# Patient Record
Sex: MALE | NOT HISPANIC OR LATINO | URBAN - METROPOLITAN AREA
[De-identification: names, ages, dates, MRNs, and addresses within clinical notes are randomized per-mention and may not be internally consistent; named-entity substitution may affect disease eponyms.]

---

## 2023-12-08 ENCOUNTER — OFFICE VISIT (OUTPATIENT)
Dept: URBAN - METROPOLITAN AREA CLINIC 19 | Facility: CLINIC | Age: 73
End: 2023-12-08
Payer: MEDICARE

## 2023-12-08 ENCOUNTER — LAB OUTSIDE AN ENCOUNTER (OUTPATIENT)
Dept: URBAN - METROPOLITAN AREA CLINIC 19 | Facility: CLINIC | Age: 73
End: 2023-12-08

## 2023-12-08 ENCOUNTER — DASHBOARD ENCOUNTERS (OUTPATIENT)
Age: 73
End: 2023-12-08

## 2023-12-08 VITALS
DIASTOLIC BLOOD PRESSURE: 72 MMHG | HEART RATE: 53 BPM | TEMPERATURE: 98.4 F | SYSTOLIC BLOOD PRESSURE: 118 MMHG | BODY MASS INDEX: 28.93 KG/M2 | WEIGHT: 180 LBS | HEIGHT: 66 IN

## 2023-12-08 DIAGNOSIS — Z12.11 SCREENING COLON CANCER: ICD-10-CM

## 2023-12-08 DIAGNOSIS — R19.4 CHANGE IN BOWEL HABIT: ICD-10-CM

## 2023-12-08 PROCEDURE — 99203 OFFICE O/P NEW LOW 30 MIN: CPT | Performed by: NURSE PRACTITIONER

## 2023-12-08 PROCEDURE — 99243 OFF/OP CNSLTJ NEW/EST LOW 30: CPT | Performed by: NURSE PRACTITIONER

## 2023-12-08 RX ORDER — POLYETHYLENE GLYCOL 3350, SODIUM CHLORIDE, SODIUM BICARBONATE, POTASSIUM CHLORIDE 420; 11.2; 5.72; 1.48 G/4L; G/4L; G/4L; G/4L
4,000 ML POWDER, FOR SOLUTION ORAL
Qty: 1 | Refills: 0 | OUTPATIENT
Start: 2023-12-08 | End: 2023-12-09

## 2023-12-08 NOTE — HPI-TODAY'S VISIT:
Mr. Leon is a 74 yo male with PMH of newly diagnosed CKD stage 3a, HTN, asthma, spinal stenosis who presents today for concern of change in bowel habits, decreased appetite. Sent upon referral from Dr. Aileen Ivey. A copy of this report will be sent to the referring provider.   No family history of colon cancer.  He reports his last colonoscopy was in 1983 in [place], reports normal but was told to repeat in 5 years.  He reports allergy to toilet paper - causes flaming itching redness so he has to use cottonelle wipes.  Reports since being diagnosed with CKD, his Nephrologist put him a new diet. States he used to eat very unhealthy diet and junk food previously so now that he is eating healthier, he has lost 12 pounds which has stabilized. States he is not so concerned with the weight loss, he feels it is due to making healthier choices.  He is concerned about change in bowel habits. States he used to have regular normal BMs every morning after coffee. Lately has noticed his BMs have been alternating between diarrhea which he will have for about a week and then he will have constipation. No bloody or black stools. No abdominal pain.  He is not on any blood thinners. Used to take Ibuprofen about 2-3 times/week but stopped taking it when his Nephrologist told him to stop. No pacemaker or stents. No significant pulm disease. Asthma well controlled.

## 2023-12-28 ENCOUNTER — OFFICE VISIT (OUTPATIENT)
Dept: URBAN - METROPOLITAN AREA SURGERY CENTER 31 | Facility: SURGERY CENTER | Age: 73
End: 2023-12-28